# Patient Record
Sex: MALE | Race: WHITE
[De-identification: names, ages, dates, MRNs, and addresses within clinical notes are randomized per-mention and may not be internally consistent; named-entity substitution may affect disease eponyms.]

---

## 2018-11-25 ENCOUNTER — HOSPITAL ENCOUNTER (EMERGENCY)
Dept: HOSPITAL 96 - M.ERS | Age: 33
Discharge: HOME | End: 2018-11-25
Payer: COMMERCIAL

## 2018-11-25 VITALS — SYSTOLIC BLOOD PRESSURE: 131 MMHG | DIASTOLIC BLOOD PRESSURE: 83 MMHG

## 2018-11-25 VITALS — WEIGHT: 186 LBS | BODY MASS INDEX: 25.19 KG/M2 | HEIGHT: 72 IN

## 2018-11-25 DIAGNOSIS — E78.00: ICD-10-CM

## 2018-11-25 DIAGNOSIS — R07.89: Primary | ICD-10-CM

## 2018-11-25 DIAGNOSIS — K21.9: ICD-10-CM

## 2018-11-25 LAB
ABSOLUTE BASOPHILS: 0.1 THOU/UL (ref 0–0.2)
ABSOLUTE EOSINOPHILS: 0.1 THOU/UL (ref 0–0.7)
ABSOLUTE MONOCYTES: 0.5 THOU/UL (ref 0–1.2)
ALBUMIN SERPL-MCNC: 3.8 G/DL (ref 3.4–5)
ALP SERPL-CCNC: 52 U/L (ref 46–116)
ALT SERPL-CCNC: 26 U/L (ref 30–65)
ANION GAP SERPL CALC-SCNC: 11 MMOL/L (ref 7–16)
APTT BLD: 29.4 SECONDS (ref 25–31.3)
AST SERPL-CCNC: 19 U/L (ref 15–37)
BASOPHILS NFR BLD AUTO: 1 %
BILIRUB SERPL-MCNC: 0.4 MG/DL
BUN SERPL-MCNC: 10 MG/DL (ref 7–18)
CALCIUM SERPL-MCNC: 9.9 MG/DL (ref 8.5–10.1)
CHLORIDE SERPL-SCNC: 105 MMOL/L (ref 98–107)
CK-MB MASS: 0.7 NG/ML
CO2 SERPL-SCNC: 26 MMOL/L (ref 21–32)
CREAT SERPL-MCNC: 0.8 MG/DL (ref 0.6–1.3)
EOSINOPHIL NFR BLD: 1.2 %
GLUCOSE SERPL-MCNC: 90 MG/DL (ref 70–99)
GRANULOCYTES NFR BLD MANUAL: 42.3 %
HCT VFR BLD CALC: 43.2 % (ref 42–52)
HGB BLD-MCNC: 14.9 GM/DL (ref 14–18)
INR PPP: 1
LIPASE: 133 U/L (ref 73–393)
LYMPHOCYTES # BLD: 2.4 THOU/UL (ref 0.8–5.3)
LYMPHOCYTES NFR BLD AUTO: 46.2 %
MAGNESIUM SERPL-MCNC: 1.9 MG/DL (ref 1.8–2.4)
MCH RBC QN AUTO: 31.1 PG (ref 26–34)
MCHC RBC AUTO-ENTMCNC: 34.4 G/DL (ref 28–37)
MCV RBC: 90.6 FL (ref 80–100)
MONOCYTES NFR BLD: 9.3 %
MPV: 8.5 FL. (ref 7.2–11.1)
NEUTROPHILS # BLD: 2.2 THOU/UL (ref 1.6–8.1)
NT-PRO BRAIN NAT PEPTIDE: 19 PG/ML (ref ?–300)
NUCLEATED RBCS: 0 /100WBC
PLATELET COUNT*: 225 THOU/UL (ref 150–400)
POTASSIUM SERPL-SCNC: 3.9 MMOL/L (ref 3.5–5.1)
PROT SERPL-MCNC: 7.5 G/DL (ref 6.4–8.2)
PROTHROMBIN TIME: 10.4 SECONDS (ref 9.2–11.5)
RBC # BLD AUTO: 4.77 MIL/UL (ref 4.5–6)
RDW-CV: 13.4 % (ref 10.5–14.5)
SODIUM SERPL-SCNC: 142 MMOL/L (ref 136–145)
TROPONIN-I LEVEL: <0.06 NG/ML (ref ?–0.06)
WBC # BLD AUTO: 5.2 THOU/UL (ref 4–11)

## 2018-11-26 NOTE — EKG
[place] [address]
Phone:  [phone]                     ELECTROCARDIOGRAM REPORT      
_______________________________________________________________________________
 
Name:       VILLA CARTAGENA                  Room:                      [facility]#:  [id number]      Account #:      [id number]  
Admission:  18     Attend Phys:                         
Discharge:  18     Date of Birth:  [birth year]  
         Report #: [id number]
    [id number]
_______________________________________________________________________________
THIS REPORT FOR:  //name//                      
 
                         [facility] ED
                                       
Test Date:    2018               Test Time:    13:16:07
Pat Name:     VILLA CARTAGENA              Department:   
Patient ID:   [id number]            Room:          
Gender:       M                        Technician:   
:          1985               Requested By: Epifanio Golden
Order Number: [id number] MD:   Brian Johnson
                                 Measurements
Intervals                              Axis          
Rate:         84                       P:            55
NH:           108                      QRS:          77
QRSD:         93                       T:            51
QT:           343                                    
QTc:          406                                    
                           Interpretive Statements
Sinus rhythm
Short NH interval
ST elev, probable normal early repol pattern
No previous ECG available for comparison
 
Electronically Signed On 2018 16:34:39 CST by Brian Johnson
[email or web address]
 
 
 
 
 
 
 
 
 
 
 
 
 
 
 
 
 
 
  <ELECTRONICALLY SIGNED>
                                           By: Brian Johnson MD, [facility]     
  18     1634
D: 18 1316   _____________________________________
T: 18 1316   Brian Johnson MD, FACC       /EPI